# Patient Record
Sex: FEMALE | Race: WHITE | NOT HISPANIC OR LATINO | Employment: PART TIME | URBAN - METROPOLITAN AREA
[De-identification: names, ages, dates, MRNs, and addresses within clinical notes are randomized per-mention and may not be internally consistent; named-entity substitution may affect disease eponyms.]

---

## 2022-03-18 PROBLEM — G25.81 RESTLESS LEG: Status: ACTIVE | Noted: 2018-11-19

## 2022-03-18 PROBLEM — E53.8 B12 DEFICIENCY: Status: ACTIVE | Noted: 2018-11-19

## 2022-08-25 ENCOUNTER — OFFICE VISIT (OUTPATIENT)
Dept: FAMILY MEDICINE CLINIC | Facility: CLINIC | Age: 22
End: 2022-08-25

## 2022-08-25 VITALS
BODY MASS INDEX: 33.38 KG/M2 | SYSTOLIC BLOOD PRESSURE: 110 MMHG | HEART RATE: 93 BPM | OXYGEN SATURATION: 99 % | DIASTOLIC BLOOD PRESSURE: 70 MMHG | HEIGHT: 63 IN | WEIGHT: 188.4 LBS

## 2022-08-25 DIAGNOSIS — R10.31 BILATERAL LOWER ABDOMINAL DISCOMFORT: ICD-10-CM

## 2022-08-25 DIAGNOSIS — Z30.46 ENCOUNTER FOR SURVEILLANCE OF IMPLANTABLE SUBDERMAL CONTRACEPTIVE: ICD-10-CM

## 2022-08-25 DIAGNOSIS — N94.10 DYSPAREUNIA IN FEMALE: ICD-10-CM

## 2022-08-25 DIAGNOSIS — G43.009 MIGRAINE WITHOUT AURA AND WITHOUT STATUS MIGRAINOSUS, NOT INTRACTABLE: ICD-10-CM

## 2022-08-25 DIAGNOSIS — R20.2 PARESTHESIA OF LEFT UPPER AND LOWER EXTREMITY: ICD-10-CM

## 2022-08-25 DIAGNOSIS — R10.32 BILATERAL LOWER ABDOMINAL DISCOMFORT: Primary | ICD-10-CM

## 2022-08-25 DIAGNOSIS — R10.32 BILATERAL LOWER ABDOMINAL DISCOMFORT: ICD-10-CM

## 2022-08-25 DIAGNOSIS — Z01.419 ROUTINE GYNECOLOGICAL EXAMINATION: ICD-10-CM

## 2022-08-25 DIAGNOSIS — R10.31 BILATERAL LOWER ABDOMINAL DISCOMFORT: Primary | ICD-10-CM

## 2022-08-25 PROBLEM — E53.8 B12 DEFICIENCY: Status: ACTIVE | Noted: 2018-11-19

## 2022-08-25 PROBLEM — E55.9 VITAMIN D DEFICIENCY: Status: ACTIVE | Noted: 2019-10-21

## 2022-08-25 PROBLEM — E66.9 OBESITY (BMI 30.0-34.9): Status: ACTIVE | Noted: 2022-08-25

## 2022-08-25 LAB
B-HCG UR QL: NEGATIVE
BILIRUB BLD-MCNC: NEGATIVE MG/DL
CLARITY, POC: ABNORMAL
COLOR UR: YELLOW
EXPIRATION DATE: ABNORMAL
EXPIRATION DATE: NORMAL
GLUCOSE UR STRIP-MCNC: NEGATIVE MG/DL
INTERNAL NEGATIVE CONTROL: NORMAL
INTERNAL POSITIVE CONTROL: NORMAL
KETONES UR QL: NEGATIVE
LEUKOCYTE EST, POC: NEGATIVE
Lab: ABNORMAL
Lab: NORMAL
NITRITE UR-MCNC: NEGATIVE MG/ML
PH UR: 6.5 [PH] (ref 5–8)
PROT UR STRIP-MCNC: ABNORMAL MG/DL
RBC # UR STRIP: NEGATIVE /UL
SP GR UR: 1.01 (ref 1–1.03)
UROBILINOGEN UR QL: NORMAL

## 2022-08-25 PROCEDURE — 81003 URINALYSIS AUTO W/O SCOPE: CPT | Performed by: STUDENT IN AN ORGANIZED HEALTH CARE EDUCATION/TRAINING PROGRAM

## 2022-08-25 PROCEDURE — 87591 N.GONORRHOEAE DNA AMP PROB: CPT | Performed by: STUDENT IN AN ORGANIZED HEALTH CARE EDUCATION/TRAINING PROGRAM

## 2022-08-25 PROCEDURE — 87491 CHLMYD TRACH DNA AMP PROBE: CPT | Performed by: STUDENT IN AN ORGANIZED HEALTH CARE EDUCATION/TRAINING PROGRAM

## 2022-08-25 PROCEDURE — 81025 URINE PREGNANCY TEST: CPT | Performed by: STUDENT IN AN ORGANIZED HEALTH CARE EDUCATION/TRAINING PROGRAM

## 2022-08-25 PROCEDURE — 99204 OFFICE O/P NEW MOD 45 MIN: CPT | Performed by: STUDENT IN AN ORGANIZED HEALTH CARE EDUCATION/TRAINING PROGRAM

## 2022-08-25 PROCEDURE — 87661 TRICHOMONAS VAGINALIS AMPLIF: CPT | Performed by: STUDENT IN AN ORGANIZED HEALTH CARE EDUCATION/TRAINING PROGRAM

## 2022-08-25 RX ORDER — ONABOTULINUMTOXINA 200 [USP'U]/1
INJECTION, POWDER, LYOPHILIZED, FOR SOLUTION INTRADERMAL; INTRAMUSCULAR
COMMUNITY
Start: 2022-05-31

## 2022-08-25 RX ORDER — FREMANEZUMAB-VFRM 225 MG/1.5ML
INJECTION SUBCUTANEOUS
COMMUNITY
Start: 2022-06-02

## 2022-08-25 RX ORDER — KETOROLAC TROMETHAMINE 10 MG/1
10 TABLET, FILM COATED ORAL AS NEEDED
COMMUNITY
Start: 2022-08-12

## 2022-08-25 RX ORDER — AMITRIPTYLINE HYDROCHLORIDE 50 MG/1
50 TABLET, FILM COATED ORAL DAILY
COMMUNITY
Start: 2022-06-02 | End: 2022-09-19

## 2022-08-25 RX ORDER — DICYCLOMINE HYDROCHLORIDE 10 MG/1
10 CAPSULE ORAL
Qty: 90 CAPSULE | Refills: 0 | Status: SHIPPED | OUTPATIENT
Start: 2022-08-25

## 2022-08-30 LAB
C TRACH RRNA SPEC QL NAA+PROBE: NEGATIVE
N GONORRHOEA RRNA SPEC QL NAA+PROBE: NEGATIVE
T VAGINALIS RRNA SPEC QL NAA+PROBE: NEGATIVE

## 2022-09-16 PROBLEM — R20.2 PARESTHESIA OF LEFT UPPER AND LOWER EXTREMITY: Status: ACTIVE | Noted: 2022-09-16

## 2022-09-19 ENCOUNTER — OFFICE VISIT (OUTPATIENT)
Dept: OBSTETRICS AND GYNECOLOGY | Facility: CLINIC | Age: 22
End: 2022-09-19

## 2022-09-19 VITALS
HEIGHT: 63 IN | BODY MASS INDEX: 20.91 KG/M2 | WEIGHT: 118 LBS | DIASTOLIC BLOOD PRESSURE: 80 MMHG | SYSTOLIC BLOOD PRESSURE: 110 MMHG

## 2022-09-19 DIAGNOSIS — R30.0 DYSURIA: ICD-10-CM

## 2022-09-19 DIAGNOSIS — R35.0 URINARY FREQUENCY: ICD-10-CM

## 2022-09-19 DIAGNOSIS — R10.2 CHRONIC PELVIC PAIN IN FEMALE: Primary | ICD-10-CM

## 2022-09-19 DIAGNOSIS — G89.29 CHRONIC PELVIC PAIN IN FEMALE: Primary | ICD-10-CM

## 2022-09-19 LAB
BACTERIA UR QL AUTO: ABNORMAL /HPF
BILIRUB UR QL STRIP: NEGATIVE
CLARITY UR: CLEAR
COLOR UR: YELLOW
GLUCOSE UR STRIP-MCNC: NEGATIVE MG/DL
HGB UR QL STRIP.AUTO: ABNORMAL
HYALINE CASTS UR QL AUTO: ABNORMAL /LPF
KETONES UR QL STRIP: NEGATIVE
LEUKOCYTE ESTERASE UR QL STRIP.AUTO: ABNORMAL
NITRITE UR QL STRIP: NEGATIVE
PH UR STRIP.AUTO: 7.5 [PH] (ref 5–8)
PROT UR QL STRIP: NEGATIVE
RBC # UR STRIP: ABNORMAL /HPF
REF LAB TEST METHOD: ABNORMAL
SP GR UR STRIP: 1.01 (ref 1–1.03)
SQUAMOUS #/AREA URNS HPF: ABNORMAL /HPF
UROBILINOGEN UR QL STRIP: ABNORMAL
WBC # UR STRIP: ABNORMAL /HPF

## 2022-09-19 PROCEDURE — 81001 URINALYSIS AUTO W/SCOPE: CPT | Performed by: NURSE PRACTITIONER

## 2022-09-19 PROCEDURE — 99203 OFFICE O/P NEW LOW 30 MIN: CPT | Performed by: NURSE PRACTITIONER

## 2022-09-19 RX ORDER — DULOXETIN HYDROCHLORIDE 20 MG/1
20 CAPSULE, DELAYED RELEASE ORAL DAILY
COMMUNITY
Start: 2022-08-25

## 2022-09-19 RX ORDER — AMITRIPTYLINE HYDROCHLORIDE 10 MG/1
TABLET, FILM COATED ORAL
COMMUNITY
Start: 2022-08-25 | End: 2022-09-19

## 2022-09-19 RX ORDER — RIZATRIPTAN BENZOATE 10 MG/1
TABLET ORAL
COMMUNITY
Start: 2022-08-25

## 2022-09-19 NOTE — PROGRESS NOTES
Subjective     Roxanne Woodson is a 22 y.o. year old    No LMP recorded (lmp unknown). Patient has had an implant.  She presents to be seen because of worsening symptoms with chronic pelvic pain.  She states she first started using Nexplanon 4 years ago for heavy painful frequent periods.  She states she would frequently have to use double protection with her bleeding and she would have her periods for 2 weeks at a time.  Frequently she would start another cycle within 14-21 days.  She would also have intermittent pelvic pain throughout her cycle.  She states when this pain is present it will last for 1 to 2 hours.  She states holding pressure on her abdomen or soaking with warm water tends to help.  She describes her pelvic pain as sharp in nature . Since Nexplanon placement her bleeding has stopped.  She has had current device for about a year after having her previous device replaced after 3 years.  She states that she has had pelvic pain so intense that this has caused her to no longer be sexually active.  She notes that her pelvic pain is worse with sexual intercourse or when she was having periods would be worse with placement of tampons.  Reports a normal Pap last year.  She declines pelvic exam today as this causes her increased pain.  She had a normal UA with her family doctor at the end of August.  She states that she has symptoms which seem like UTI symptoms but her urine is always normal.  Her symptoms include dysuria and pelvic pain.  She was thought to have irritable bowel syndrome.  She had a GI work-up which was normal per patient.  She is taking Bentyl however does not notice any change in her symptoms.  She had negative STD screening off her urine at her PCP at the end of August.  She has not been sexually active since that time and has no concerns for STIs.  She has migraines which also increased in frequency over the last year.  She is concerned as the pelvic pain has affected her life.  She  "frequently has to call off of work due to pain.  She would like to be sexually active but knows this would be too painful for her.  She reports having an ultrasound in the past which she thinks was done transabdominally but cannot be sure.  No records in her chart available for review.  She has never had any GYN surgery.  Her only surgery was a breast reduction in 2018.  She does have anxiety and depression.  She is on Cymbalta 20 mg daily for this.  She is anxious in the office today with concerns for possible pelvic exam as she notes this will increase her pain for days.    The following portions of the patient's history were reviewed and updated as appropriate:current medications and allergies    Social History    Tobacco Use      Smoking status: Never Smoker      Smokeless tobacco: Never Used         Objective   /80 (BP Location: Left arm, Patient Position: Sitting, Cuff Size: Adult)   Ht 160 cm (63\")   Wt 53.5 kg (118 lb)   LMP  (LMP Unknown) Comment: nexplanon  Breastfeeding No   BMI 20.90 kg/m²     Lab Review   UA and STD screening from PCPs office visit at the end of August reviewed    Imaging   No data reviewed        Assessment   1. Chronic pelvic pain in female  2. Dysuria     Plan   1. Discussed possible causes of chronic pelvic pain with patient.  Discussed with patient could be related to interstitial cystitis or endometriosis.  We will refer her to urology to rule out interstitial cystitis.  Discussed with patient endometriosis can only be diagnosed through diagnostic lap.  Discussed how endometriosis is recurrent condition and ideally symptoms are treated medically for as long as they can look for surgery is needed.   2. We will have patient return to care for ultrasound for further evaluation.  We will try to do as many abdominal views as possible due to her concerns for increased pain with transvaginal ultrasound.  If urology work-up negative and ultrasound findings normal will refer her " to MD for further evaluation of possible endometriosis.  3. ua sent to lab.   4. The importance of keeping all planned follow-up and taking all medications as prescribed was emphasized.  5. Patient understands she is due for her annual exam but would like to schedule this at a later date due to pelvic pain with pelvic exam.      No orders of the defined types were placed in this encounter.         This note was electronically signed.    Carley Sanchez, BRANT  September 19, 2022

## 2022-10-27 ENCOUNTER — OFFICE VISIT (OUTPATIENT)
Dept: UROLOGY | Facility: CLINIC | Age: 22
End: 2022-10-27

## 2022-10-27 VITALS — WEIGHT: 118 LBS | BODY MASS INDEX: 20.91 KG/M2 | HEIGHT: 63 IN

## 2022-10-27 DIAGNOSIS — R30.0 DYSURIA: Primary | ICD-10-CM

## 2022-10-27 DIAGNOSIS — M62.89 PELVIC FLOOR DYSFUNCTION: ICD-10-CM

## 2022-10-27 LAB
BILIRUB BLD-MCNC: NEGATIVE MG/DL
CLARITY, POC: ABNORMAL
COLOR UR: YELLOW
EXPIRATION DATE: ABNORMAL
GLUCOSE UR STRIP-MCNC: NEGATIVE MG/DL
KETONES UR QL: NEGATIVE
LEUKOCYTE EST, POC: NEGATIVE
Lab: ABNORMAL
NITRITE UR-MCNC: NEGATIVE MG/ML
PH UR: 7.5 [PH] (ref 5–8)
PROT UR STRIP-MCNC: ABNORMAL MG/DL
RBC # UR STRIP: NEGATIVE /UL
SP GR UR: 1.01 (ref 1–1.03)
UROBILINOGEN UR QL: NORMAL

## 2022-10-27 PROCEDURE — 51798 US URINE CAPACITY MEASURE: CPT | Performed by: UROLOGY

## 2022-10-27 PROCEDURE — 99203 OFFICE O/P NEW LOW 30 MIN: CPT | Performed by: UROLOGY

## 2022-10-27 PROCEDURE — 81003 URINALYSIS AUTO W/O SCOPE: CPT | Performed by: UROLOGY

## 2022-10-27 RX ORDER — TOPIRAMATE 25 MG/1
25 TABLET ORAL 2 TIMES DAILY
COMMUNITY

## 2022-10-27 NOTE — PROGRESS NOTES
[facility] Female Office Visit      Patient Name: Roxanne Woodson  : 2000   MRN: [id number]     Chief Complaint:  Lower Urinary Tract Symptoms.   Chief Complaint   Patient presents with   • Difficulty Urinating       Referring Provider: Carley Sanchez CNM    History of Present Illness: Mr. Woodson is a 22 y.o. female with history of frequency and urgency.  She reports she feels like she has a UTI and then is not positive for a UTI.  She also reports that she has cramps after she voids.  There was thought it might be an ovarian issues and she has seen her GYN but was not found to have any issues.  No dysuria or gross hematuria.  Never had kidney stones.  She has occasional urge incontinence.  She feels like she can not empty her bladder.  She has seen GI for constipation issues but has not followed up.      She also complains of dyspareunia and states its very painful to have intercourse and that is why she is not sexually active usually.  She reports she can not use tampons.        Subjective      Review of System:   Review of Systems   Constitutional: Negative.    HENT: Negative.    Eyes: Negative.    Respiratory: Negative.    Cardiovascular: Negative.    Gastrointestinal: Negative.    Genitourinary: Positive for frequency and urgency.   Allergic/Immunologic: Negative.    Neurological: Negative.    Hematological: Negative.    Psychiatric/Behavioral: Negative.       I have reviewed the ROS documented by my clinical staff, I have updated appropriately and I agree. Adrienne Rodriguez MD    Past Medical History:  Past Medical History:   Diagnosis Date   • Anxiety    • Headache        Past Surgical History:  Past Surgical History:   Procedure Laterality Date   • BILATERAL BREAST REDUCTION Bilateral        Medications:    Current Outpatient Medications:   •  Ajovy 225 MG/1.5ML solution auto-injector, Every 30 (Thirty) Days., Disp: , Rfl:   •  Botox 200 units reconstituted solution, INJECT 200 UNITS IN THE MUSCLE  "EVERY 3 MONTHS. TO BE ADMINISTERED A MD OFFICE, Disp: , Rfl:   •  dicyclomine (Bentyl) 10 MG capsule, Take 1 capsule by mouth 4 (Four) Times a Day Before Meals & at Bedtime As Needed (abdominal cramping)., Disp: 90 capsule, Rfl: 0  •  Etonogestrel (NEXPLANON) 68 MG implant subdermal implant, Inject  under the skin into the appropriate area as directed., Disp: , Rfl:   •  ketorolac (TORADOL) 10 MG tablet, Take 10 mg by mouth As Needed. for pain, Disp: , Rfl:   •  rizatriptan (MAXALT) 10 MG tablet, TAKE 1 TABLET BY MOUTH AS NEEDED FOR HEADACHE. DO NOT EXCEED 2 TABS / 24 HRS, Disp: , Rfl:   •  topiramate (TOPAMAX) 25 MG tablet, Take 1 tablet by mouth 2 (Two) Times a Day., Disp: , Rfl:   •  DULoxetine (CYMBALTA) 20 MG capsule, Take 20 mg by mouth Daily., Disp: , Rfl:     Allergies:  No Known Allergies    Social History:  Social History     Socioeconomic History   • Marital status: Single   • Number of children: 0   Tobacco Use   • Smoking status: Never   • Smokeless tobacco: Never   Substance and Sexual Activity   • Alcohol use: Yes     Comment: once monthly   • Drug use: Yes     Types: Marijuana   • Sexual activity: Not Currently     Partners: Male     Birth control/protection: Implant, Nexplanon       Family History:  Family History   Problem Relation Age of Onset   • Hypertension Mother    • Prostate cancer Paternal Grandfather          Post void residual bladder scan:    29mL    Objective     Physical Exam:   Vital Signs:   Vitals:    10/27/22 1451   Weight: 53.5 kg (118 lb)   Height: 160 cm (62.99\")   PainSc: 0-No pain     Body mass index is 20.91 kg/m².     Physical Exam  Vitals and nursing note reviewed. Exam conducted with a chaperone present.   Constitutional:       General: She is awake. She is not in acute distress.     Appearance: Normal appearance.   HENT:      Head: Normocephalic and atraumatic.      Right Ear: External ear normal.      Left Ear: External ear normal.      Nose: Nose normal.   Eyes:      " Conjunctiva/sclera: Conjunctivae normal.   Pulmonary:      Effort: Pulmonary effort is normal. No respiratory distress.   Abdominal:      General: Abdomen is flat. There is no distension.      Palpations: Abdomen is soft. There is no mass.      Tenderness: There is no abdominal tenderness. There is no right CVA tenderness, left CVA tenderness, guarding or rebound.      Hernia: No hernia is present.   Genitourinary:     Exam position: Lithotomy position.   Skin:     General: Skin is warm.   Neurological:      General: No focal deficit present.      Mental Status: She is alert and oriented to person, place, and time.      Gait: Gait normal.   Psychiatric:         Behavior: Behavior normal. Behavior is cooperative.         Thought Content: Thought content normal.         Judgment: Judgment normal.         Labs:   Brief Urine Lab Results  (Last result in the past 365 days)      Color   Clarity   Blood   Leuk Est   Nitrite   Protein   CREAT   Urine HCG        10/27/22 1504 Yellow   Cloudy   Negative   Negative   Negative   Trace                      No results found for: GLUCOSE, CALCIUM, NA, K, CO2, CL, BUN, CREATININE, EGFRIFAFRI, EGFRIFNONA, BCR, ANIONGAP    No results found for: WBC, HGB, HCT, MCV, PLT    Images:   No Images in the past 120 days found..    Measures:   Tobacco:   Roxanne Woodson  reports that she has never smoked. She has never used smokeless tobacco.    Urine Incontinence: Patient reports that she is not currently experiencing any symptoms of urinary incontinence.        Assessment / Plan      Assessment:  Mrs. Woodson is a 22 y.o. female who presented today with what sounds like pelvic floor dysfunction.  She has the classic symptoms.  I discussed a pelvic exam today but she states this is extremely painful for her and deferred.  I think she would benefit from pelvic floor PT.  I will make a referral for her.   We will see her back in 4 months.     Diagnoses and all orders for this visit:    1.  Dysuria (Primary)  -     POC Urinalysis Dipstick, Automated    2. Pelvic floor dysfunction  -     Ambulatory Referral to Physical Therapy Evaluate and treat, Pelvic Floor        Follow Up:   Return in about 4 months (around 2/27/2023) for Recheck.    I spent approximately 30 minutes providing clinical care for this patient; including review of patient's chart and provider documentation, face to face time spent with patient in examination room (obtaining history, performing physical exam, discussing diagnosis and management options), placing orders, and completing patient documentation.     Adrienne Rodriguez MD  [facility]

## 2023-01-24 ENCOUNTER — TELEPHONE (OUTPATIENT)
Dept: PHYSICAL THERAPY | Facility: CLINIC | Age: 23
End: 2023-01-24

## 2023-05-23 RX ORDER — OXCARBAZEPINE 150 MG/1
TABLET, FILM COATED ORAL
COMMUNITY
Start: 2020-01-22

## 2023-05-23 RX ORDER — ONDANSETRON 4 MG/1
4 TABLET, ORALLY DISINTEGRATING ORAL EVERY 8 HOURS PRN
COMMUNITY
Start: 2020-02-14